# Patient Record
Sex: FEMALE | URBAN - METROPOLITAN AREA
[De-identification: names, ages, dates, MRNs, and addresses within clinical notes are randomized per-mention and may not be internally consistent; named-entity substitution may affect disease eponyms.]

---

## 2020-10-14 ENCOUNTER — OFFICE VISIT (OUTPATIENT)
Dept: URBAN - METROPOLITAN AREA CLINIC 22 | Facility: CLINIC | Age: 40
End: 2020-10-14
Payer: COMMERCIAL

## 2020-10-14 DIAGNOSIS — H44.22 DEGENERATIVE MYOPIA, LEFT EYE: ICD-10-CM

## 2020-10-14 DIAGNOSIS — H31.092 OTHER CHORIORETINAL SCARS, LEFT EYE: ICD-10-CM

## 2020-10-14 PROCEDURE — 99204 OFFICE O/P NEW MOD 45 MIN: CPT | Performed by: OPTOMETRIST

## 2020-10-14 ASSESSMENT — INTRAOCULAR PRESSURE
OD: 20
OS: 21

## 2020-10-14 ASSESSMENT — VISUAL ACUITY: OS: 20/40

## 2020-10-14 NOTE — IMPRESSION/PLAN
Impression: Other vitreous opacities, left eye: H43.392. Left.  Plan: signs and symptoms of RD explained, RTC immediately

## 2020-10-21 ENCOUNTER — TESTING ONLY (OUTPATIENT)
Dept: URBAN - METROPOLITAN AREA CLINIC 23 | Facility: CLINIC | Age: 40
End: 2020-10-21
Payer: COMMERCIAL

## 2020-10-21 DIAGNOSIS — H43.392 OTHER VITREOUS OPACITIES, LEFT EYE: Primary | ICD-10-CM

## 2020-10-21 PROCEDURE — 92134 CPTRZ OPH DX IMG PST SGM RTA: CPT | Performed by: OPTOMETRIST

## 2020-10-21 PROCEDURE — 92133 CPTRZD OPH DX IMG PST SGM ON: CPT | Performed by: OPTOMETRIST

## 2020-10-21 NOTE — ASSESSMENT/PLAN
Impression: OCT MAC - OD: Good-CME; OS: Good-CME Plan: OCT shows poss CME or old CME.  Consideering Pt complaint of spots in central vision, refer to [facility] for MAC Eval.

## 2020-11-03 ENCOUNTER — OFFICE VISIT (OUTPATIENT)
Dept: URBAN - METROPOLITAN AREA CLINIC 33 | Facility: CLINIC | Age: 40
End: 2020-11-03
Payer: COMMERCIAL

## 2020-11-03 DIAGNOSIS — H35.343 MACULAR CYST, HOLE, OR PSEUDOHOLE, BILATERAL: Primary | ICD-10-CM

## 2020-11-03 PROCEDURE — 92134 CPTRZ OPH DX IMG PST SGM RTA: CPT | Performed by: OPHTHALMOLOGY

## 2020-11-03 PROCEDURE — 99204 OFFICE O/P NEW MOD 45 MIN: CPT | Performed by: OPHTHALMOLOGY

## 2020-11-03 ASSESSMENT — INTRAOCULAR PRESSURE
OD: 17
OS: 17

## 2020-11-03 NOTE — IMPRESSION/PLAN
Impression: Macular cyst, hole, or pseudohole, bilateral: H35.343. lamellar hole Plan: OCT ordered and performed today, Discussed diagnosis in detail with patient. No treatment is required at this time. Reassured patient of current condition and treatment. Will continue to observe condition and or symptoms. Call if [address] worsens.

## 2020-12-14 ENCOUNTER — OFFICE VISIT (OUTPATIENT)
Dept: URBAN - METROPOLITAN AREA CLINIC 32 | Facility: CLINIC | Age: 40
End: 2020-12-14
Payer: COMMERCIAL

## 2020-12-14 PROCEDURE — 92134 CPTRZ OPH DX IMG PST SGM RTA: CPT | Performed by: OPHTHALMOLOGY

## 2020-12-14 PROCEDURE — 92014 COMPRE OPH EXAM EST PT 1/>: CPT | Performed by: OPHTHALMOLOGY

## 2020-12-14 RX ORDER — OFLOXACIN 3 MG/ML
0.3 % SOLUTION/ DROPS OPHTHALMIC
Qty: 5 | Refills: 3 | Status: INACTIVE
Start: 2020-12-14 | End: 2021-05-20

## 2020-12-14 RX ORDER — PREDNISOLONE ACETATE 10 MG/ML
1 % SUSPENSION/ DROPS OPHTHALMIC
Qty: 10 | Refills: 5 | Status: INACTIVE
Start: 2020-12-14 | End: 2021-05-20

## 2020-12-14 ASSESSMENT — INTRAOCULAR PRESSURE
OS: 17
OD: 17

## 2020-12-14 NOTE — IMPRESSION/PLAN
Impression: Macular cyst, hole, or pseudohole, bilateral: H35.343. Bilateral. Lamellar hole. Condition: unstable OD and stabilizing OS. Vision: vision affected worsening OD and stable OS. Pt high Myopia OU Plan: Gonio exam done today to close examine both retina's today. Gonio exam of the right eye shows full thickness hole centrally, with light beam test patient see's slight curve in center of straight light beam. Gonio exam left eye shows not a definite fullness hole possible stage 1, with light beam test patient see's straight white beam without curves or breaks in it. Discussed diagnosis in detail with patient. Discussed risks of progression. Recommend close observation for the left eye at this time. Surgical treatment is recommended to repair the retina PPVx RIGHT EYE. Surgical risks and benefits were discussed, explained and understood by patient. Unable to tell how much vision will be recovered. Discussed gas bubble and post-op care: no traveling, flying or high altitude for approximately 6 - 8 weeks. All questions answered. Patient elects to proceed with recommendation. RL1. OCT OD shows tiny hole centrally and OCT OS shows possible impending hole. Educational material provided to patient. ERxed drops to pharmacy on file.

## 2021-01-05 ENCOUNTER — SURGERY (OUTPATIENT)
Dept: URBAN - METROPOLITAN AREA SURGERY 11 | Facility: SURGERY | Age: 41
End: 2021-01-05
Payer: COMMERCIAL

## 2021-01-05 PROCEDURE — 67042 VIT FOR MACULAR HOLE: CPT | Performed by: OPHTHALMOLOGY

## 2021-01-06 ENCOUNTER — POST-OPERATIVE VISIT (OUTPATIENT)
Dept: URBAN - METROPOLITAN AREA CLINIC 32 | Facility: CLINIC | Age: 41
End: 2021-01-06

## 2021-01-06 PROCEDURE — 99024 POSTOP FOLLOW-UP VISIT: CPT | Performed by: OPTOMETRIST

## 2021-01-06 ASSESSMENT — INTRAOCULAR PRESSURE
OD: 7
OS: 13

## 2021-01-11 ENCOUNTER — POST-OPERATIVE VISIT (OUTPATIENT)
Dept: URBAN - METROPOLITAN AREA CLINIC 32 | Facility: CLINIC | Age: 41
End: 2021-01-11

## 2021-01-11 DIAGNOSIS — Z48.810 ENCOUNTER FOR SURGICAL AFTERCARE FOLLOWING SURGERY ON A SENSE ORGAN: Primary | ICD-10-CM

## 2021-01-11 PROCEDURE — 99024 POSTOP FOLLOW-UP VISIT: CPT | Performed by: OPHTHALMOLOGY

## 2021-01-11 ASSESSMENT — INTRAOCULAR PRESSURE: OD: 16

## 2021-01-11 NOTE — IMPRESSION/PLAN
Impression: S/P PPVx 25g; AFX (Air Fluid Gas Exchange); Epiretinal Membranectomy OD - 6 Days. Encounter for surgical aftercare following surgery on a sense organ  Z48.810. Plan: Advise patient that the gas will slowly decrease and the vision will slowly improve. Patient can sleep in any position. Recommend a retina f/u in 3 - 4 wks. No swimming at this time, ok to use a stationery bike. OK to drive. Continue using PF OD QID until gone, d/c Ofloxacin after tomorrow. Unable to obtain OCT OD.

## 2021-02-03 ENCOUNTER — POST-OPERATIVE VISIT (OUTPATIENT)
Dept: URBAN - METROPOLITAN AREA CLINIC 23 | Facility: CLINIC | Age: 41
End: 2021-02-03

## 2021-02-03 PROCEDURE — 99024 POSTOP FOLLOW-UP VISIT: CPT | Performed by: OPHTHALMOLOGY

## 2021-02-03 ASSESSMENT — INTRAOCULAR PRESSURE: OD: 23

## 2021-02-03 NOTE — IMPRESSION/PLAN
Impression: S/P PPVx 25g; AFX (Air Fluid Gas Exchange); Epiretinal Membranectomy OD - 29 Days. Encounter for surgical aftercare following surgery on a sense organ  Z48.810. Excellent post op course   Post operative instructions reviewed - Condition is improving - Plan: Due to Coronavirus COVID-19 pandemic and National Emergency, deferred Slit Lamp examination. Findings are based on OCT and Optos. OCT shows no ERM no edema OD         and Optos shows retina attached, decreased gas bubble OD. No treatment is require at this time. Recommend a retina follow - up in 6 weeks.  --Continue Prednisolone acetate 1% QID OD until bottle empty then D/C

## 2021-03-19 ENCOUNTER — POST-OPERATIVE VISIT (OUTPATIENT)
Dept: URBAN - METROPOLITAN AREA CLINIC 23 | Facility: CLINIC | Age: 41
End: 2021-03-19
Payer: COMMERCIAL

## 2021-03-19 PROCEDURE — 99024 POSTOP FOLLOW-UP VISIT: CPT | Performed by: OPHTHALMOLOGY

## 2021-03-19 ASSESSMENT — INTRAOCULAR PRESSURE: OD: 17

## 2021-03-19 NOTE — IMPRESSION/PLAN
Impression: S/P PPVx 25g; AFX (Air Fluid Gas Exchange); Epiretinal Membranectomy OD - 73 Days. Encounter for surgical aftercare following surgery on a sense organ  Z48.810. Excellent post op course   Post operative instructions reviewed - Condition is improving - Plan: Exam OD shows no MH, retina is stable with very small gas bubble. Patient can resume normal activities, no restrictions. Recommend a retina f.u in 2 mos. No qtts needed at this time.

## 2021-05-20 ENCOUNTER — OFFICE VISIT (OUTPATIENT)
Dept: URBAN - METROPOLITAN AREA CLINIC 23 | Facility: CLINIC | Age: 41
End: 2021-05-20
Payer: COMMERCIAL

## 2021-05-20 DIAGNOSIS — H35.341 MACULAR CYST, HOLE, OR PSEUDOHOLE, RIGHT EYE: Primary | ICD-10-CM

## 2021-05-20 PROCEDURE — 92134 CPTRZ OPH DX IMG PST SGM RTA: CPT | Performed by: OPHTHALMOLOGY

## 2021-05-20 PROCEDURE — 99213 OFFICE O/P EST LOW 20 MIN: CPT | Performed by: OPHTHALMOLOGY

## 2021-05-20 ASSESSMENT — INTRAOCULAR PRESSURE: OD: 12

## 2021-05-20 NOTE — IMPRESSION/PLAN
Impression: s/p PPVX for Repair of Macular cyst, hole, or pseudohole, right eye with Gas 01/05/2021: H35.341. Right. Condition: resolved with surgery. Vision: vision improved. Plan: Discussed diagnosis in detail with patient. Exam OD shows the macula is stable post retina surgery. No treatment is required at this time. Will continue to observe condition and or symptoms. OCT OD shows macula is attached and stable and Optos OD shows retina is attached and stable, no MH. Patient can proceed with a refraction for Contact Lenses or new glasses. Recommend a retina f.u in 1 yr.

## 2022-08-11 ENCOUNTER — OFFICE VISIT (OUTPATIENT)
Dept: URBAN - METROPOLITAN AREA CLINIC 23 | Facility: CLINIC | Age: 42
End: 2022-08-11
Payer: COMMERCIAL

## 2022-08-11 DIAGNOSIS — H35.341 MACULAR CYST, HOLE, OR PSEUDOHOLE, RIGHT EYE: Primary | ICD-10-CM

## 2022-08-11 PROCEDURE — 99213 OFFICE O/P EST LOW 20 MIN: CPT | Performed by: OPHTHALMOLOGY

## 2022-08-11 PROCEDURE — 92134 CPTRZ OPH DX IMG PST SGM RTA: CPT | Performed by: OPHTHALMOLOGY

## 2022-08-11 ASSESSMENT — INTRAOCULAR PRESSURE
OS: 11
OD: 11

## 2022-08-11 NOTE — IMPRESSION/PLAN
Impression: s/p PPVX for Repair of Macular cyst, hole, or pseudohole, right eye with Gas 01/05/2021: H35.341. Right. Condition: resolved with surgery. Vision: vision improved. Plan: Discussed diagnosis in detail with patient. Exam shows the retina / macula is stable. Diagnostic tests confirmed findings. Discussed treatment options with patient. No treatment is required at this time. Will continue to observe condition and or symptoms. Recommend a retina consult in 1 month. Patient asked if she would be ok to proceed with LASIK with Dr. Enrrique Pérez. Discussed the retina surgery went great. Patient ok to consider any possible treatment on the eye assuming she is a good candidate. At this point, patient does not seem to be a good candidate for LASIK because she is more likely to develop a cataract within the next 1 - 2 years. LASIK is not recommended at this time for OD. Explained that an ICL implant would be a better option based on her eye history. Explained to patient that it is ultimately her decision.

## 2024-06-17 ENCOUNTER — OFFICE VISIT (OUTPATIENT)
Dept: URBAN - METROPOLITAN AREA CLINIC 23 | Facility: CLINIC | Age: 44
End: 2024-06-17
Payer: COMMERCIAL

## 2024-06-17 DIAGNOSIS — H16.223 KERATOCONJUNCTIVITIS SICCA, NOT SPECIFIED AS SJ”GREN'S, BILATERAL: Primary | ICD-10-CM

## 2024-06-17 PROCEDURE — 99204 OFFICE O/P NEW MOD 45 MIN: CPT | Performed by: OPTOMETRIST

## 2024-06-17 RX ORDER — LOTEPREDNOL ETABONATE 2.5 MG/ML
0.25 % SUSPENSION/ DROPS OPHTHALMIC
Qty: 8.3 | Refills: 2 | Status: ACTIVE
Start: 2024-06-17

## 2024-06-17 ASSESSMENT — INTRAOCULAR PRESSURE
OS: 13
OD: 13

## 2024-07-26 ENCOUNTER — OFFICE VISIT (OUTPATIENT)
Dept: URBAN - METROPOLITAN AREA CLINIC 23 | Facility: CLINIC | Age: 44
End: 2024-07-26
Payer: COMMERCIAL

## 2024-07-26 DIAGNOSIS — H16.223 KERATOCONJUNCTIVITIS SICCA, NOT SPECIFIED AS SJ”GREN'S, BILATERAL: Primary | ICD-10-CM

## 2024-07-26 PROCEDURE — 99213 OFFICE O/P EST LOW 20 MIN: CPT | Performed by: OPTOMETRIST

## 2024-07-26 RX ORDER — CYCLOSPORINE 0.5 MG/ML
0.05 % EMULSION OPHTHALMIC
Qty: 180 | Refills: 3 | Status: ACTIVE
Start: 2024-07-26

## 2024-07-26 ASSESSMENT — INTRAOCULAR PRESSURE
OS: 11
OD: 11

## 2024-07-26 ASSESSMENT — KERATOMETRY
OS: 42.50
OD: 42.88

## 2024-10-25 ENCOUNTER — OFFICE VISIT (OUTPATIENT)
Dept: URBAN - METROPOLITAN AREA CLINIC 23 | Facility: CLINIC | Age: 44
End: 2024-10-25
Payer: COMMERCIAL

## 2024-10-25 DIAGNOSIS — H16.223 KERATOCONJUNCTIVITIS SICCA, NOT SPECIFIED AS SJ”GREN'S, BILATERAL: Primary | ICD-10-CM

## 2024-10-25 PROCEDURE — 99213 OFFICE O/P EST LOW 20 MIN: CPT | Performed by: OPTOMETRIST

## 2024-10-25 ASSESSMENT — KERATOMETRY
OS: 42.63
OD: 43.00

## 2024-10-25 ASSESSMENT — INTRAOCULAR PRESSURE
OS: 11
OD: 11

## 2024-11-19 ENCOUNTER — REFRACTIVE (OUTPATIENT)
Dept: URBAN - METROPOLITAN AREA CLINIC 23 | Facility: CLINIC | Age: 44
End: 2024-11-19

## 2024-11-19 DIAGNOSIS — H52.13 MYOPIA, BILATERAL: Primary | ICD-10-CM

## 2024-11-19 PROCEDURE — 99213 OFFICE O/P EST LOW 20 MIN: CPT | Performed by: OPHTHALMOLOGY

## 2024-11-19 ASSESSMENT — KERATOMETRY
OD: 42.75
OS: 42.75

## 2024-11-19 ASSESSMENT — VISUAL ACUITY
OD: 20/40
OS: 20/20

## 2024-11-19 ASSESSMENT — INTRAOCULAR PRESSURE
OD: 12
OS: 13